# Patient Record
Sex: FEMALE | Race: WHITE | NOT HISPANIC OR LATINO | Employment: OTHER | URBAN - METROPOLITAN AREA
[De-identification: names, ages, dates, MRNs, and addresses within clinical notes are randomized per-mention and may not be internally consistent; named-entity substitution may affect disease eponyms.]

---

## 2022-05-06 PROBLEM — G47.33 OSA (OBSTRUCTIVE SLEEP APNEA): Status: ACTIVE | Noted: 2022-03-31

## 2022-05-06 PROBLEM — E78.2 MIXED HYPERLIPIDEMIA: Status: ACTIVE | Noted: 2021-10-01

## 2022-05-06 PROBLEM — M35.00 SJOGREN'S DISEASE: Status: ACTIVE | Noted: 2021-11-01

## 2022-05-06 PROBLEM — R00.2 PALPITATIONS: Status: ACTIVE | Noted: 2021-10-01

## 2022-05-06 PROBLEM — M06.9 RHEUMATOID ARTHRITIS: Status: ACTIVE | Noted: 2021-01-14

## 2022-05-06 NOTE — PROGRESS NOTES
Chief Complaint  Palpitations and Hyperlipidemia      History of Present Illness  Alejandrina Bhardwaj presents to [facility] CARDIOLOGY  Patient is a 57-year-old female with a recent diagnosis of lupus and rheumatoid arthritis who had noticed increased heart palpitations and heart fluttering spells.  She states she notices more so when she lays down at night and is not associated with any ongoing chest discomfort or shortness of breath issues.  She has been under increased amount of stress recently due to the passing of 3 relatives in the last year, recent moving to the area, and her diagnosis of lupus and rheumatoid arthritis.  She does not report any significant change in her functional exercise capacity she did undergo an echocardiogram which had some abnormalities which she had some anxiety regarding the changes given some family history of valvular issues with other family members    Past Medical History:   Diagnosis Date   • Hyperlipidemia    • Lupus (systemic lupus erythematosus) (HCC)    • Palpitation    • Rheumatoid arthritis (HCC)    • Sjogren's disease (HCC)          Current Outpatient Medications:   •  Ayr 0.65 % nasal spray, , Disp: , Rfl:   •  Cholecalciferol 25 MCG (1000 UT) tablet dispersible, Take  by mouth Daily., Disp: , Rfl:   •  hydrocortisone 2.5 % cream, APPLY A THIN LAYER TO THE SORES ON FACE TWICE DAILY, Disp: , Rfl:   •  hydroxychloroquine (PLAQUENIL) 200 MG tablet, Daily., Disp: , Rfl:   •  loratadine (CLARITIN) 10 MG tablet, Take 10 mg by mouth As Needed., Disp: , Rfl:   •  mometasone (ELOCON) 0.1 % ointment, Daily., Disp: , Rfl:   •  Multiple Vitamins-Minerals (EMERGEN-C IMMUNE PO), Take 1 tablet by mouth As Needed., Disp: , Rfl:   •  mupirocin (BACTROBAN) 2 % ointment, APPLY TO LESION ON LIP TWICE DAILY UNTIL RESOLVED, Disp: , Rfl:   •  omeprazole (priLOSEC) 40 MG capsule, Take 40 mg by mouth Daily., Disp: , Rfl:   •  sertraline (ZOLOFT) 25 MG tablet, Take 25 mg by mouth  "Daily., Disp: , Rfl:     There are no discontinued medications.  Allergies   Allergen Reactions   • Iodinated Diagnostic Agents Hives and Itching   • Erythromycin Nausea And Vomiting and Rash        Social History     Tobacco Use   • Smoking status: Never Smoker   Vaping Use   • Vaping Use: Never used   Substance Use Topics   • Alcohol use: Never   • Drug use: Never       History reviewed. No pertinent family history.     Objective     /58   Pulse 80   Ht 160 cm (63\")   Wt 68.5 kg (151 lb)   BMI 26.75 kg/m²       Physical Exam    General Appearance:   · no acute distress  · Alert and oriented x3  HENT:   · lips not cyanotic  · Atraumatic  Neck:  · No jvd   · supple  Respiratory:  · no respiratory distress  · normal breath sounds  · no rales  Cardiovascular:  · Regular rate and rhythm  · no S3, no S4   · no murmur  · no rub  Extremities  · No cyanosis  · lower extremity edema: none    Skin:   · warm, dry  · No rashes    Result Review :     No results found for: PROBNP     WBC (WHITE BLOOD CELL)-TL   4.8 - 10.8 10*3/uL 3.7 Low     RBC (RED BLOOD CELL-TL   4.20 - 5.40 10*6/uL 4.52    HEMOGLOBIN-TL   12.0 - 16.0 g/dL 13.2    HEMATOCRIT-TL   37 - 47 % 40.8    MCV-TL   81 - 99 fL 90.3    MCH-TL   27 - 31 pg 29.2    MCHC-TL   32 - 36 g/dL 32.4    RDW-SD-TL   37 - 54 fL 46.5    RDW-SD-TL   11.5 - 15.5 % 13.8    Platelet Count-TL   130 - 400 10*3/uL 94 Low     MPV-TL   9.2 - 12.6 fL 12.0    NRBC%-TL   0.0 % 0.0    ABS NRBC-TL   0.0 10*3/uL 0.000    DIFF TYPE-TL  AUTO DIFF    NEUT%-TL   % 67.9    LYMPH%-TL   % 21.1    MONO%-TL   % 8.9    Eosinophil %   % 1.6    BASO%-TL   % 0.5    IG %-TL   % 0.0    ABS NEUT-TL   2.2 - 4.8 10*3/uL 2.500    ABS LYMPH-TL   1.3 - 2.9 10*3/uL 0.780 Low     ABS MONO-TL   0.1 - 1.0 10*3/uL 0.330    ABS EOS-TL   0.00 - 0.40 10*3/uL 0.060    ABS BASO-TL   0.0 - 0.1 10*3/uL <0.030    ABS IG-TL   0.0 - 2.0 10*3/uL <0.030      Order: [id number]  Component   Ref Range & Units 7 mo ago "   GLUCOSE-TL   70 - 110 mg/dL 91    BUN-TL   7 - 18 mg/dL 12    CREATININE-TL   0.6 - 1.0 mg/dL 0.8    SODIUM-TL   136 - 145 mmol/L 143    POTASSIUM-TL   3.5 - 5.1 mmol/L 4.2    Chloride   98 - 107 mmol/L 104    CARBON DIOXIDE-TL   21 - 32 mmol/L 28    CALCIUM-TL   8.7 - 10.2 mg/dL 10.3 High     TOTAL PROTEIN-TL   6.4 - 8.2 g/dL 8.3 High     ALBUMIN-TL   3.4 - 5.0 g/dL 4.5    TOTAL BILIRUBIN-TL   0.2 - 1.3 mg/dL 0.39    GOT (AST)-TL   15 - 37 U/L 28    GPT (ALT)-TL   0 - 35 U/L 22    ALP-TL   50 - 136 U/L 112    GFR ESTIMATE-TL   mL/min 74    IF -TL   mL/min 89      CHOLESTEROL 2-TL   <200 mg/dL 172    Comment:    NATIONAL CHOLESTEROL GUIDELINES   [facility] ([facility]) GUIDELINES FOR CLASSIFICATION, TESTING AND MANAGEMENT OF CHOLESTEROL LEVELS IN ADULTS OVER 20 YEARS OF AGE. THIS NEW CLASSIFICATION CREATES THREE CATEGORIES OF RISK FOR CORONARY HEART DISEASE, REGARDLESS   OF AGE OR SEX, ACCORDING TO TOTAL LDL CHOLESTEROLS LEVELS.     BASED ON TOTAL CHOLESTEROL LEVEL   DESIRABLE      <200 MG/DL   BORDERLINE-HIGH 200-239 MG/DL   HIGH            >=240 MG/DL   TRIGLYCERIDES 2-TL   30 - 200 mg/dL 150    Comment: DUE TO REVISED SPECIFICITY OF A TRIGLYCERIDE RESULT AT 600MG/DL OR GREATER MAY CAUSE A POSITIVE BIAS OF APPROXIMATELY 2.1 MMOL/L TO CHLORIDE WHICH CAN RESULT IN AN ERRONEOUSLY LOW ANION GAP.   HDL-TL   >60 mg/dL 42 Low     Comment:    <40 MG/DL= MAJOR RISK FACTOR FOR CHD   60 MG/DL OR GREATER= NEG RISK FACTOR FOR CHD   LDL Cholesterol    0 - 99 mg/dL 100 High     RISK FACTOR-TL  4.1             Echocardiogram 1/22    CONCLUSION:  Preserved RV and LV systolic function with evidence of grade   1   diastolic dysfunction.   No significant valvular disease.  Trivial tricuspid insufficiency.  RV   pressure   is 17 mmHg.     Holter monitor 1/22  Rare PVCs  CONCLUSION:  Fairly unremarkable 24-hour Holter as described         ECG 12 Lead    Date/Time: 5/19/2022 12:30 PM  Performed  by: Fermin Bhardwaj MD  Authorized by: Fermin Bhardwaj MD   Comparison: not compared with previous ECG   Comments: Consider inferior MI old           No results found for this or any previous visit.             The ASCVD Risk score (Leola MAMTA Jr., et al., 2013) failed to calculate for the following reasons:    Cannot find a previous HDL lab    Cannot find a previous total cholesterol lab     Diagnoses and all orders for this visit:    1. Palpitations (Primary)  Assessment & Plan:  Patient with increased frequency of heart palpitations noticing at night echocardiogram was unremarkable and Holter showed just rare PVCs.  Feel the symptoms are caused by PVCs and likely exacerbated by increased stress in her life reassured patient of the benign nature.  Encourage a trial of magnesium supplementation 500 mg once a day if symptoms are not tolerable after that then low-dose beta-blocker or calcium channel blockers would be an alternative as well      2. PVC's (premature ventricular contractions)  Assessment & Plan:  And frequents with structurally normal heart and EKG feel benign in nature does recommend a trial of magnesium at this point      Other orders  -     ECG 12 Lead          Follow Up     No follow-ups on file.          Patient was given instructions and counseling regarding her condition or for health maintenance advice. Please see specific information pulled into the AVS if appropriate.

## 2022-05-19 ENCOUNTER — OFFICE VISIT (OUTPATIENT)
Dept: CARDIOLOGY | Facility: CLINIC | Age: 57
End: 2022-05-19

## 2022-05-19 VITALS
SYSTOLIC BLOOD PRESSURE: 111 MMHG | WEIGHT: 151 LBS | HEIGHT: 63 IN | HEART RATE: 80 BPM | DIASTOLIC BLOOD PRESSURE: 58 MMHG | BODY MASS INDEX: 26.75 KG/M2

## 2022-05-19 DIAGNOSIS — I49.3 PVC'S (PREMATURE VENTRICULAR CONTRACTIONS): ICD-10-CM

## 2022-05-19 DIAGNOSIS — R00.2 PALPITATIONS: Primary | ICD-10-CM

## 2022-05-19 PROCEDURE — 93000 ELECTROCARDIOGRAM COMPLETE: CPT | Performed by: INTERNAL MEDICINE

## 2022-05-19 PROCEDURE — 99203 OFFICE O/P NEW LOW 30 MIN: CPT | Performed by: INTERNAL MEDICINE

## 2022-05-19 RX ORDER — HYDROXYCHLOROQUINE SULFATE 200 MG/1
200 TABLET, FILM COATED ORAL
COMMUNITY
Start: 2022-05-16

## 2022-05-19 RX ORDER — OMEPRAZOLE 40 MG/1
40 CAPSULE, DELAYED RELEASE ORAL DAILY
COMMUNITY
Start: 2022-05-09

## 2022-05-19 RX ORDER — LORATADINE 10 MG/1
10 TABLET ORAL AS NEEDED
COMMUNITY
Start: 2022-05-09

## 2022-05-19 RX ORDER — ALLOPURINOL 300 MG/1
2 TABLET ORAL AS NEEDED
COMMUNITY
Start: 2022-05-09

## 2022-05-19 RX ORDER — MOMETASONE FUROATE 1 MG/G
OINTMENT TOPICAL DAILY
COMMUNITY
Start: 2022-04-15

## 2022-05-19 RX ORDER — SERTRALINE HYDROCHLORIDE 25 MG/1
25 TABLET, FILM COATED ORAL DAILY
COMMUNITY
Start: 2022-05-02 | End: 2022-10-07 | Stop reason: SDUPTHER

## 2022-05-19 NOTE — ASSESSMENT & PLAN NOTE
Patient with increased frequency of heart palpitations noticing at night echocardiogram was unremarkable and Holter showed just rare PVCs.  Feel the symptoms are caused by PVCs and likely exacerbated by increased stress in her life reassured patient of the benign nature.  Encourage a trial of magnesium supplementation 500 mg once a day if symptoms are not tolerable after that then low-dose beta-blocker or calcium channel blockers would be an alternative as well

## 2022-05-19 NOTE — ASSESSMENT & PLAN NOTE
And frequents with structurally normal heart and EKG feel benign in nature does recommend a trial of magnesium at this point

## 2022-10-07 ENCOUNTER — OFFICE VISIT (OUTPATIENT)
Dept: FAMILY MEDICINE CLINIC | Facility: CLINIC | Age: 57
End: 2022-10-07

## 2022-10-07 ENCOUNTER — HOSPITAL ENCOUNTER (OUTPATIENT)
Dept: GENERAL RADIOLOGY | Facility: HOSPITAL | Age: 57
Discharge: HOME | End: 2022-10-07
Admitting: FAMILY MEDICINE

## 2022-10-07 VITALS
HEIGHT: 63 IN | BODY MASS INDEX: 28.53 KG/M2 | DIASTOLIC BLOOD PRESSURE: 104 MMHG | HEART RATE: 99 BPM | TEMPERATURE: 97.6 F | SYSTOLIC BLOOD PRESSURE: 128 MMHG | OXYGEN SATURATION: 100 % | WEIGHT: 161 LBS

## 2022-10-07 DIAGNOSIS — E78.2 MIXED HYPERLIPIDEMIA: ICD-10-CM

## 2022-10-07 DIAGNOSIS — R53.82 CHRONIC FATIGUE: ICD-10-CM

## 2022-10-07 DIAGNOSIS — M35.01 SJOGREN'S SYNDROME WITH KERATOCONJUNCTIVITIS SICCA: ICD-10-CM

## 2022-10-07 DIAGNOSIS — M25.512 CHRONIC LEFT SHOULDER PAIN: ICD-10-CM

## 2022-10-07 DIAGNOSIS — G89.29 CHRONIC LEFT SHOULDER PAIN: ICD-10-CM

## 2022-10-07 DIAGNOSIS — Z00.00 ENCOUNTER FOR MEDICAL EXAMINATION TO ESTABLISH CARE: ICD-10-CM

## 2022-10-07 DIAGNOSIS — R35.0 URINE FREQUENCY: Primary | ICD-10-CM

## 2022-10-07 DIAGNOSIS — Z12.31 ENCOUNTER FOR SCREENING MAMMOGRAM FOR MALIGNANT NEOPLASM OF BREAST: ICD-10-CM

## 2022-10-07 DIAGNOSIS — M05.9 RHEUMATOID ARTHRITIS WITH POSITIVE RHEUMATOID FACTOR, INVOLVING UNSPECIFIED SITE: ICD-10-CM

## 2022-10-07 DIAGNOSIS — M34.1 CREST (CALCINOSIS, RAYNAUD'S PHENOMENON, ESOPHAGEAL DYSFUNCTION, SCLERODACTYLY, TELANGIECTASIA): ICD-10-CM

## 2022-10-07 LAB
ALBUMIN SERPL-MCNC: 4.4 G/DL (ref 3.5–5.2)
ALBUMIN/GLOB SERPL: 1 G/DL
ALP SERPL-CCNC: 137 U/L (ref 39–117)
ALT SERPL W P-5'-P-CCNC: 37 U/L (ref 1–33)
ANION GAP SERPL CALCULATED.3IONS-SCNC: 7.8 MMOL/L (ref 5–15)
AST SERPL-CCNC: 45 U/L (ref 1–32)
BASOPHILS # BLD AUTO: 0.03 10*3/MM3 (ref 0–0.2)
BASOPHILS NFR BLD AUTO: 0.8 % (ref 0–1.5)
BILIRUB BLD-MCNC: NEGATIVE MG/DL
BILIRUB SERPL-MCNC: 0.4 MG/DL (ref 0–1.2)
BUN SERPL-MCNC: 10 MG/DL (ref 6–20)
BUN/CREAT SERPL: 10.8 (ref 7–25)
CALCIUM SPEC-SCNC: 9.9 MG/DL (ref 8.6–10.5)
CHLORIDE SERPL-SCNC: 102 MMOL/L (ref 98–107)
CHOLEST SERPL-MCNC: 179 MG/DL (ref 0–200)
CLARITY, POC: CLEAR
CO2 SERPL-SCNC: 29.2 MMOL/L (ref 22–29)
COLOR UR: YELLOW
CREAT SERPL-MCNC: 0.93 MG/DL (ref 0.57–1)
DEPRECATED RDW RBC AUTO: 40.2 FL (ref 37–54)
EGFRCR SERPLBLD CKD-EPI 2021: 70.9 ML/MIN/1.73
EOSINOPHIL # BLD AUTO: 0.06 10*3/MM3 (ref 0–0.4)
EOSINOPHIL NFR BLD AUTO: 1.7 % (ref 0.3–6.2)
ERYTHROCYTE [DISTWIDTH] IN BLOOD BY AUTOMATED COUNT: 13 % (ref 12.3–15.4)
EXPIRATION DATE: NORMAL
FOLATE SERPL-MCNC: 8.29 NG/ML (ref 4.78–24.2)
GLOBULIN UR ELPH-MCNC: 4.6 GM/DL
GLUCOSE SERPL-MCNC: 92 MG/DL (ref 65–99)
GLUCOSE UR STRIP-MCNC: NEGATIVE MG/DL
HCT VFR BLD AUTO: 40 % (ref 34–46.6)
HDLC SERPL-MCNC: 44 MG/DL (ref 40–60)
HGB BLD-MCNC: 13.6 G/DL (ref 12–15.9)
KETONES UR QL: NEGATIVE
LDLC SERPL CALC-MCNC: 111 MG/DL (ref 0–100)
LDLC/HDLC SERPL: 2.45 {RATIO}
LEUKOCYTE EST, POC: NEGATIVE
LYMPHOCYTES # BLD AUTO: 0.83 10*3/MM3 (ref 0.7–3.1)
LYMPHOCYTES NFR BLD AUTO: 23 % (ref 19.6–45.3)
Lab: NORMAL
MCH RBC QN AUTO: 29.1 PG (ref 26.6–33)
MCHC RBC AUTO-ENTMCNC: 34 G/DL (ref 31.5–35.7)
MCV RBC AUTO: 85.5 FL (ref 79–97)
MONOCYTES # BLD AUTO: 0.36 10*3/MM3 (ref 0.1–0.9)
MONOCYTES NFR BLD AUTO: 10 % (ref 5–12)
NEUTROPHILS NFR BLD AUTO: 2.32 10*3/MM3 (ref 1.7–7)
NEUTROPHILS NFR BLD AUTO: 64.2 % (ref 42.7–76)
NITRITE UR-MCNC: NEGATIVE MG/ML
PH UR: 7.5 [PH] (ref 5–8)
PLATELET # BLD AUTO: 97 10*3/MM3 (ref 140–450)
PMV BLD AUTO: 13.6 FL (ref 6–12)
POTASSIUM SERPL-SCNC: 4.4 MMOL/L (ref 3.5–5.2)
PROT SERPL-MCNC: 9 G/DL (ref 6–8.5)
PROT UR STRIP-MCNC: NEGATIVE MG/DL
RBC # BLD AUTO: 4.68 10*6/MM3 (ref 3.77–5.28)
RBC # UR STRIP: NEGATIVE /UL
SODIUM SERPL-SCNC: 139 MMOL/L (ref 136–145)
SP GR UR: 1.01 (ref 1–1.03)
T4 FREE SERPL-MCNC: 1.12 NG/DL (ref 0.93–1.7)
TRIGL SERPL-MCNC: 136 MG/DL (ref 0–150)
TSH SERPL DL<=0.05 MIU/L-ACNC: 2.12 UIU/ML (ref 0.27–4.2)
UROBILINOGEN UR QL: NORMAL
VIT B12 BLD-MCNC: 620 PG/ML (ref 211–946)
VLDLC SERPL-MCNC: 24 MG/DL (ref 5–40)
WBC NRBC COR # BLD: 3.61 10*3/MM3 (ref 3.4–10.8)

## 2022-10-07 PROCEDURE — 82746 ASSAY OF FOLIC ACID SERUM: CPT | Performed by: FAMILY MEDICINE

## 2022-10-07 PROCEDURE — 80050 GENERAL HEALTH PANEL: CPT | Performed by: FAMILY MEDICINE

## 2022-10-07 PROCEDURE — 80061 LIPID PANEL: CPT | Performed by: FAMILY MEDICINE

## 2022-10-07 PROCEDURE — 99204 OFFICE O/P NEW MOD 45 MIN: CPT | Performed by: FAMILY MEDICINE

## 2022-10-07 PROCEDURE — 84439 ASSAY OF FREE THYROXINE: CPT | Performed by: FAMILY MEDICINE

## 2022-10-07 PROCEDURE — 82607 VITAMIN B-12: CPT | Performed by: FAMILY MEDICINE

## 2022-10-07 PROCEDURE — 73030 X-RAY EXAM OF SHOULDER: CPT

## 2022-10-07 RX ORDER — PILOCARPINE HYDROCHLORIDE 5 MG/1
5 TABLET, FILM COATED ORAL
COMMUNITY
Start: 2022-09-22

## 2022-10-07 RX ORDER — TRAZODONE HYDROCHLORIDE 50 MG/1
50 TABLET ORAL NIGHTLY
COMMUNITY
Start: 2022-08-20

## 2022-10-07 RX ORDER — LIFITEGRAST 50 MG/ML
SOLUTION/ DROPS OPHTHALMIC
COMMUNITY
Start: 2022-09-20 | End: 2022-12-30

## 2022-10-07 RX ORDER — HYDROXYCHLOROQUINE SULFATE 200 MG/1
200 TABLET, FILM COATED ORAL DAILY
COMMUNITY
Start: 2022-09-22 | End: 2022-10-07 | Stop reason: SDUPTHER

## 2022-10-07 RX ORDER — OMEPRAZOLE 40 MG/1
1 CAPSULE, DELAYED RELEASE ORAL DAILY
COMMUNITY
Start: 2022-08-22 | End: 2022-10-07 | Stop reason: SDUPTHER

## 2022-10-07 RX ORDER — SERTRALINE HYDROCHLORIDE 25 MG/1
1 TABLET, FILM COATED ORAL DAILY
COMMUNITY
Start: 2022-08-22 | End: 2022-10-07 | Stop reason: SDUPTHER

## 2022-10-07 RX ORDER — BUSPIRONE HYDROCHLORIDE 7.5 MG/1
7.5 TABLET ORAL 2 TIMES DAILY
COMMUNITY
Start: 2022-08-20 | End: 2022-12-30

## 2022-10-07 NOTE — PROGRESS NOTES
Chief Complaint   Patient presents with   • Establish Care     Back pain         Subjective     Alejandrina Bhardwaj  has a past medical history of Lupus (systemic lupus erythematosus) (HCC), Rheumatoid arthritis (HCC), and Sjogren's disease (HCC).    Establish care- she presents today to establish care with a new PCP.    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  Currently she is treated with Plaquenil.  She does going get her routine eye exams every 6 months.    Hyperlipidemia- currently she is not on any cholesterol medication.    Anxiety disorder- she takes her sertraline 25 mg daily.  She states for the most part she feels depressed very frequently and has frequent tearful episodes.      PHQ-2 Depression Screening  Little interest or pleasure in doing things?     Feeling down, depressed, or hopeless?     PHQ-2 Total Score     PHQ-9 Depression Screening  Little interest or pleasure in doing things?     Feeling down, depressed, or hopeless?     Trouble falling or staying asleep, or sleeping too much?     Feeling tired or having little energy?     Poor appetite or overeating?     Feeling bad about yourself - or that you are a failure or have let yourself or your family down?     Trouble concentrating on things, such as reading the newspaper or watching television?     Moving or speaking so slowly that other people could have noticed? Or the opposite - being so fidgety or restless that you have been moving around a lot more than usual?     Thoughts that you would be better off dead, or of hurting yourself in some way?     PHQ-9 Total Score     If you checked off any problems, how difficult have these problems made it for you to do your work, take care of things at home, or get along with other people?       Allergies   Allergen Reactions   • Erythromycin Nausea And Vomiting and Rash   • Contrast Dye Hives   • Iodinated Diagnostic Agents Hives and Itching   • Iodine Hives       Prior to Admission  medications    Medication Sig Start Date End Date Taking? Authorizing Provider   Ayr 0.65 % nasal spray 2 sprays As Needed. 5/9/22  Yes Oralia Chris MD   busPIRone (BUSPAR) 7.5 MG tablet Take 7.5 mg by mouth 2 (Two) Times a Day. 8/20/22  Yes Oralia Chris MD   Cholecalciferol 25 MCG (1000 UT) tablet dispersible Take  by mouth Daily.   Yes Oralia Chris MD   hydrocortisone 2.5 % cream APPLY A THIN LAYER TO THE SORES ON FACE TWICE DAILY 4/15/22  Yes Oralia Chris MD   hydroxychloroquine (PLAQUENIL) 200 MG tablet Take 200 mg by mouth. 5/16/22  Yes Oralia Chris MD   loratadine (CLARITIN) 10 MG tablet Take 10 mg by mouth As Needed. 5/9/22  Yes Oralia Chris MD   mometasone (ELOCON) 0.1 % ointment Daily. 4/15/22  Yes Oralia Chris MD   mupirocin (BACTROBAN) 2 % ointment APPLY TO LESION ON LIP TWICE DAILY UNTIL RESOLVED 4/15/22  Yes Oralia Chris MD   omeprazole (priLOSEC) 40 MG capsule Take 40 mg by mouth Daily. 5/9/22  Yes Oralia Chris MD   pilocarpine (SALAGEN) 5 MG tablet Take 5 mg by mouth. DAILY 9/22/22  Yes Oralia Chris MD   sertraline (ZOLOFT) 25 MG tablet Take 25 mg by mouth Daily. 5/2/22  Yes Oralia Chris MD   traZODone (DESYREL) 50 MG tablet Take 50 mg by mouth Every Night. 8/20/22  Yes Oralia Chris MD   Xiidra 5 % ophthalmic solution  9/20/22  Yes Oralia Chris MD   hydroxychloroquine (PLAQUENIL) 200 MG tablet Take 200 mg by mouth Daily. 9/22/22 10/7/22  Oralia Chris MD   Multiple Vitamins-Minerals (EMERGEN-C IMMUNE PO) Take 1 tablet by mouth As Needed.  10/7/22  Oralia Chris MD   omeprazole (priLOSEC) 40 MG capsule Take 1 capsule by mouth Daily. 8/22/22 10/7/22  Oralia Chris MD   sertraline (ZOLOFT) 25 MG tablet Take 1 tablet by mouth Daily. 8/22/22 10/7/22  Provider, Historical, MD        Patient Active Problem List   Diagnosis   • Mixed hyperlipidemia   • VICTOIRANO (obstructive  sleep apnea)   • Palpitations   • Lupus (systemic lupus erythematosus) (HCC)   • Rheumatoid arthritis (HCC)   • Sjogren's disease (HCC)   • PVC's (premature ventricular contractions)   • CREST (calcinosis, Raynaud's phenomenon, esophageal dysfunction, sclerodactyly, telangiectasia) (HCC)   • Chronic fatigue   • Encounter for medical examination to establish care   • Encounter for screening mammogram for malignant neoplasm of breast   • Chronic left shoulder pain        History reviewed. No pertinent surgical history.    Social History     Socioeconomic History   • Marital status: Single   Tobacco Use   • Smoking status: Never Smoker   • Smokeless tobacco: Never Used   Vaping Use   • Vaping Use: Never used   Substance and Sexual Activity   • Alcohol use: Never   • Drug use: Never   • Sexual activity: Defer       Family History   Problem Relation Age of Onset   • Lupus Mother    • Lupus Sister    • Cancer Paternal Grandmother        Family history, surgical history, past medical history, Allergies and meds reviewed with patient today and updated in [employer] EMR.     ROS:  Review of Systems   Constitutional: Positive for fatigue.   HENT: Positive for rhinorrhea. Negative for congestion and postnasal drip.    Eyes: Negative for blurred vision and visual disturbance.        (+) Dry eyes   Respiratory: Negative for cough, chest tightness, shortness of breath and wheezing.    Cardiovascular: Negative for chest pain and palpitations.   Genitourinary: Positive for frequency. Negative for difficulty urinating, dysuria and hematuria.   Musculoskeletal: Positive for back pain (thoracic).   Skin: Negative for rash and skin lesions.   Allergic/Immunologic: Negative for environmental allergies.   Neurological: Negative for light-headedness and headache.   Psychiatric/Behavioral: Positive for depressed mood. Negative for sleep disturbance. The patient is nervous/anxious.        OBJECTIVE:  Vitals:    10/07/22 0952   BP: (!) 128/104  "  BP Location: Right arm   Patient Position: Sitting   Pulse: 99   Temp: 97.6 °F (36.4 °C)   SpO2: 100%   Weight: 73 kg (161 lb)   Height: 160 cm (63\")     No exam data present   Body mass index is 28.52 kg/m².  No LMP recorded.    Physical Exam  Vitals and nursing note reviewed.   Constitutional:       General: She is not in acute distress.     Appearance: Normal appearance. She is normal weight.   HENT:      Head: Normocephalic.      Right Ear: Tympanic membrane, ear canal and external ear normal.      Left Ear: Tympanic membrane, ear canal and external ear normal.      Nose: Nose normal.      Mouth/Throat:      Mouth: Mucous membranes are moist.      Dentition: Has dentures.      Pharynx: Oropharynx is clear.   Eyes:      General: No scleral icterus.     Conjunctiva/sclera: Conjunctivae normal.      Pupils: Pupils are equal, round, and reactive to light.   Cardiovascular:      Rate and Rhythm: Normal rate and regular rhythm.      Pulses: Normal pulses.      Heart sounds: Normal heart sounds. No murmur heard.  Pulmonary:      Effort: Pulmonary effort is normal.      Breath sounds: Normal breath sounds. No wheezing, rhonchi or rales.   Musculoskeletal:      Cervical back: Neck supple. No rigidity or tenderness.   Lymphadenopathy:      Cervical: No cervical adenopathy.   Skin:     General: Skin is warm and dry.      Coloration: Skin is not jaundiced.      Findings: No rash.      Comments: Telangiectasia face and hands   Neurological:      General: No focal deficit present.      Mental Status: She is alert and oriented to person, place, and time.   Psychiatric:         Mood and Affect: Mood normal.         Thought Content: Thought content normal.         Judgment: Judgment normal.         Procedures    Office Visit on 10/07/2022   Component Date Value Ref Range Status   • Color 10/07/2022 Yellow  Yellow, Straw, Dark Yellow, Brittani Final   • Clarity, UA 10/07/2022 Clear  Clear Final   • Specific Gravity  10/07/2022 " 1.015  1.005 - 1.030 Final   • pH, Urine 10/07/2022 7.5  5.0 - 8.0 Final   • Leukocytes 10/07/2022 Negative  Negative Final   • Nitrite, UA 10/07/2022 Negative  Negative Final   • Protein, POC 10/07/2022 Negative  Negative mg/dL Final   • Glucose, UA 10/07/2022 Negative  Negative mg/dL Final   • Ketones, UA 10/07/2022 Negative  Negative Final   • Urobilinogen, UA 10/07/2022 0.2 E.U./dL  Normal, 0.2 E.U./dL Final   • Bilirubin 10/07/2022 Negative  Negative Final   • Blood, UA 10/07/2022 Negative  Negative Final   • Lot Number 10/07/2022 [id number]   Final   • Expiration Date 10/07/2022 02/30/2023   Final       ASSESSMENT/ PLAN:    Diagnoses and all orders for this visit:    1. Urine frequency (Primary)  -     POCT urinalysis dipstick, automated    2. CREST (calcinosis, Raynaud's phenomenon, esophageal dysfunction, sclerodactyly, telangiectasia) (HCC)  Assessment & Plan:  She sees rheumatology on a regular basis and currently on Plaquenil.  We discussed all the common findings with this syndrome and that she needs to wear gloves socks on a regular basis make sure that she is staying warm on a regular basis also to prevent any type of vaso-occlusive crises.  I also instructed her on some type of routine low impact activity such as walking or yoga.      3. Sjogren's syndrome with keratoconjunctivitis sicca (HCC)    4. Rheumatoid arthritis with positive rheumatoid factor, involving unspecified site (HCC)    5. Mixed hyperlipidemia  Assessment & Plan:  It appears to been a while since she had a routine lipid profile.  We will update that with her labs here today.    Orders:  -     Comprehensive Metabolic Panel  -     Lipid Panel  -     TSH+Free T4    6. Chronic fatigue  Assessment & Plan:  Her chronic fatigue is most likely related to her chronic rheumatologic condition.  We will get some routine labs regardless of this.    Orders:  -     CBC Auto Differential  -     Comprehensive Metabolic Panel  -     TSH+Free T4  -      Vitamin B12 & Folate    7. Encounter for medical examination to establish care  Assessment & Plan:  She presents today to establish care.  We will update some routine baseline studies.  It has been quite sometime since she had a routine gynecological exam.  Encouraged that she needs to have that done on a periodic also regular basis.      8. Encounter for screening mammogram for malignant neoplasm of breast  -     Mammo Screening Digital Tomosynthesis Bilateral With CAD; Future    9. Chronic left shoulder pain  -     XR Shoulder 2+ View Left; Future    Other orders  -     sertraline (ZOLOFT) 50 MG tablet; Take 1 tablet by mouth Daily.  Dispense: 90 tablet; Refill: 1      Orders Placed Today:     New Medications Ordered This Visit   Medications   • sertraline (ZOLOFT) 50 MG tablet     Sig: Take 1 tablet by mouth Daily.     Dispense:  90 tablet     Refill:  1        Management Plan:     An After Visit Summary was printed and given to the patient at discharge.    Follow-up: Return in about 2 months (around 12/7/2022).    Rickey Mcginnis DO 10/7/2022 10:44 EDT  This note was electronically signed.

## 2022-10-07 NOTE — ASSESSMENT & PLAN NOTE
It appears to been a while since she had a routine lipid profile.  We will update that with her labs here today.

## 2022-10-07 NOTE — ASSESSMENT & PLAN NOTE
Her chronic fatigue is most likely related to her chronic rheumatologic condition.  We will get some routine labs regardless of this.

## 2022-10-07 NOTE — ASSESSMENT & PLAN NOTE
She presents today to establish care.  We will update some routine baseline studies.  It has been quite sometime since she had a routine gynecological exam.  Encouraged that she needs to have that done on a periodic also regular basis.

## 2022-10-07 NOTE — ASSESSMENT & PLAN NOTE
She sees rheumatology on a regular basis and currently on Plaquenil.  We discussed all the common findings with this syndrome and that she needs to wear gloves socks on a regular basis make sure that she is staying warm on a regular basis also to prevent any type of vaso-occlusive crises.  I also instructed her on some type of routine low impact activity such as walking or yoga.

## 2022-10-10 DIAGNOSIS — R79.89 ELEVATED LFTS: Primary | ICD-10-CM

## 2022-10-11 ENCOUNTER — PATIENT ROUNDING (BHMG ONLY) (OUTPATIENT)
Dept: FAMILY MEDICINE CLINIC | Facility: CLINIC | Age: 57
End: 2022-10-11

## 2022-10-11 NOTE — PROGRESS NOTES
A Symplified MESSAGE HAS BEEN SENT TO THE PATIENT FOR PATIENT ROUNDING WITH [facility]

## 2022-10-12 ENCOUNTER — HOSPITAL ENCOUNTER (OUTPATIENT)
Dept: ULTRASOUND IMAGING | Facility: HOSPITAL | Age: 57
Discharge: HOME | End: 2022-10-12
Admitting: FAMILY MEDICINE

## 2022-10-12 DIAGNOSIS — R79.89 ELEVATED LFTS: ICD-10-CM

## 2022-10-12 PROCEDURE — 76705 ECHO EXAM OF ABDOMEN: CPT

## 2022-10-13 DIAGNOSIS — R16.1 SPLENOMEGALY: Primary | ICD-10-CM

## 2022-10-27 ENCOUNTER — TELEPHONE (OUTPATIENT)
Dept: FAMILY MEDICINE CLINIC | Facility: CLINIC | Age: 57
End: 2022-10-27

## 2022-10-27 DIAGNOSIS — R16.1 SPLENOMEGALY: Primary | ICD-10-CM

## 2022-10-29 ENCOUNTER — TELEPHONE (OUTPATIENT)
Dept: FAMILY MEDICINE CLINIC | Facility: CLINIC | Age: 57
End: 2022-10-29

## 2022-10-29 NOTE — TELEPHONE ENCOUNTER
Was seen at an urgent Care and tested positive for Covid 19.  She was not prescribed any medicaiton at that time.  She notes to have congestion and cough. She would like Paxlovid.  Reviewed with patient facts of EUA  Medication and side effect potential.  Reviewed her kidney function to be adequate.  Noted with patient there may be medication she would have to hold while taking paxlovid for the five day treatment.  She would like to read on before prescribing.  I let her know that she could call back and I would send in but encouraged her to look at a fact sheet on Paxlovid.

## 2022-10-31 ENCOUNTER — TELEPHONE (OUTPATIENT)
Dept: FAMILY MEDICINE CLINIC | Facility: CLINIC | Age: 57
End: 2022-10-31

## 2022-10-31 NOTE — TELEPHONE ENCOUNTER
Caller: Zheng Bhardwajraisa BUSBY    Relationship: Self    Best call back number: [phone]    What is the best time to reach you: ANYTIME    Who are you requesting to speak with (clinical staff, provider,  specific staff member): CLINICAL    What was the call regarding: PATIENT IS REQUESTING CALL REGARDING POSITIVE COVID CASE. SHE TESTED POSITIVE ON 10/29/2022 THROUGH URGENT CARE. HER CURRENT SYMPTOMS ARE NAUSEA, COUGH, DIARRHEA, AND BODY ACHES. SHE WAS PRESCRIBED BROM/PSE/DM COUGH SYRUP BUT IT JUST PUTS HER TO SLEEP AND WAS ADVISED TO TAKE OVER THE COUNTER MUCINEX.. SHE IS SEEKING ADVICE ON WHAT SHE SHOULD DO OR MEDICATIONS SHE SHOULD TAKE.      IF ANYTHING NEEDS TO BE PRESCRIBED, [facility] [address] [place] [address] [phone]  - [phone] [facility]  [phone] IS THE PHARMACY.     Do you require a callback: YES

## 2022-11-07 ENCOUNTER — APPOINTMENT (OUTPATIENT)
Dept: CT IMAGING | Facility: HOSPITAL | Age: 57
End: 2022-11-07

## 2022-11-07 ENCOUNTER — OFFICE VISIT (OUTPATIENT)
Dept: FAMILY MEDICINE CLINIC | Facility: CLINIC | Age: 57
End: 2022-11-07

## 2022-11-07 VITALS
WEIGHT: 161 LBS | SYSTOLIC BLOOD PRESSURE: 104 MMHG | OXYGEN SATURATION: 99 % | BODY MASS INDEX: 28.53 KG/M2 | DIASTOLIC BLOOD PRESSURE: 66 MMHG | TEMPERATURE: 97.8 F | HEART RATE: 83 BPM | HEIGHT: 63 IN

## 2022-11-07 DIAGNOSIS — J10.1 INFLUENZA B: ICD-10-CM

## 2022-11-07 DIAGNOSIS — U07.1 COVID: ICD-10-CM

## 2022-11-07 DIAGNOSIS — J01.00 ACUTE NON-RECURRENT MAXILLARY SINUSITIS: ICD-10-CM

## 2022-11-07 DIAGNOSIS — J30.2 SEASONAL ALLERGIES: ICD-10-CM

## 2022-11-07 DIAGNOSIS — M32.9 SYSTEMIC LUPUS ERYTHEMATOSUS, UNSPECIFIED SLE TYPE, UNSPECIFIED ORGAN INVOLVEMENT STATUS: ICD-10-CM

## 2022-11-07 DIAGNOSIS — R05.9 COUGH, UNSPECIFIED TYPE: Primary | ICD-10-CM

## 2022-11-07 DIAGNOSIS — R09.81 NASAL CONGESTION: ICD-10-CM

## 2022-11-07 PROBLEM — D72.810 LYMPHOCYTOPENIA: Status: ACTIVE | Noted: 2021-10-01

## 2022-11-07 PROBLEM — M25.521 RIGHT ELBOW PAIN: Status: ACTIVE | Noted: 2022-03-31

## 2022-11-07 PROBLEM — R07.9 CHEST PAIN: Status: ACTIVE | Noted: 2022-02-08

## 2022-11-07 PROBLEM — R23.3 PETECHIAL RASH: Status: ACTIVE | Noted: 2021-10-01

## 2022-11-07 PROBLEM — R30.0 DIFFICULT OR PAINFUL URINATION: Status: ACTIVE | Noted: 2022-03-01

## 2022-11-07 PROBLEM — M54.9 COSTOVERTEBRAL ANGLE TENDERNESS: Status: ACTIVE | Noted: 2022-03-01

## 2022-11-07 PROBLEM — F41.9 ANXIETY: Status: ACTIVE | Noted: 2021-10-01

## 2022-11-07 PROBLEM — S46.212A STRAIN OF LEFT BICEPS: Status: ACTIVE | Noted: 2022-03-31

## 2022-11-07 PROBLEM — R91.1 PULMONARY NODULE LESS THAN 1 CM IN DIAMETER WITH LOW RISK FOR MALIGNANT NEOPLASM: Status: ACTIVE | Noted: 2021-10-01

## 2022-11-07 PROBLEM — R10.9 LEFT FLANK PAIN: Status: ACTIVE | Noted: 2021-10-01

## 2022-11-07 PROBLEM — M54.2 NECK PAIN ON RIGHT SIDE: Status: ACTIVE | Noted: 2022-03-01

## 2022-11-07 PROBLEM — I51.89 GRADE I DIASTOLIC DYSFUNCTION: Status: ACTIVE | Noted: 2022-01-10

## 2022-11-07 PROBLEM — K21.9 GASTROESOPHAGEAL REFLUX DISEASE: Status: ACTIVE | Noted: 2022-02-08

## 2022-11-07 PROBLEM — L30.4 INTERTRIGINOUS DERMATITIS ASSOCIATED WITH MOISTURE: Status: ACTIVE | Noted: 2022-03-01

## 2022-11-07 PROBLEM — M41.9 SCOLIOSIS OF THORACIC SPINE: Status: ACTIVE | Noted: 2021-10-01

## 2022-11-07 PROBLEM — Z91.89 PULMONARY NODULE LESS THAN 1 CM IN DIAMETER WITH LOW RISK FOR MALIGNANT NEOPLASM: Status: ACTIVE | Noted: 2021-10-01

## 2022-11-07 LAB
EXPIRATION DATE: ABNORMAL
FLUAV AG UPPER RESP QL IA.RAPID: NOT DETECTED
FLUBV AG UPPER RESP QL IA.RAPID: DETECTED
INTERNAL CONTROL: ABNORMAL
Lab: ABNORMAL
SARS-COV-2 AG UPPER RESP QL IA.RAPID: DETECTED

## 2022-11-07 PROCEDURE — 87428 SARSCOV & INF VIR A&B AG IA: CPT | Performed by: NURSE PRACTITIONER

## 2022-11-07 PROCEDURE — 99213 OFFICE O/P EST LOW 20 MIN: CPT | Performed by: NURSE PRACTITIONER

## 2022-11-07 RX ORDER — BROMPHENIRAMINE MALEATE, PSEUDOEPHEDRINE HYDROCHLORIDE, AND DEXTROMETHORPHAN HYDROBROMIDE 2; 30; 10 MG/5ML; MG/5ML; MG/5ML
SYRUP ORAL
COMMUNITY
Start: 2022-10-29 | End: 2022-12-30

## 2022-11-07 RX ORDER — GUAIFENESIN 600 MG/1
600 TABLET, EXTENDED RELEASE ORAL 2 TIMES DAILY
COMMUNITY
Start: 2022-10-30 | End: 2022-12-30

## 2022-11-07 RX ORDER — DEXTROMETHORPHAN HYDROBROMIDE AND PROMETHAZINE HYDROCHLORIDE 15; 6.25 MG/5ML; MG/5ML
5 SYRUP ORAL 4 TIMES DAILY PRN
Qty: 473 ML | Refills: 0 | Status: SHIPPED | OUTPATIENT
Start: 2022-11-07 | End: 2022-11-21

## 2022-11-07 RX ORDER — CEFDINIR 300 MG/1
300 CAPSULE ORAL 2 TIMES DAILY
Qty: 20 CAPSULE | Refills: 0 | Status: SHIPPED | OUTPATIENT
Start: 2022-11-07 | End: 2022-11-17

## 2022-11-07 RX ORDER — OSELTAMIVIR PHOSPHATE 75 MG/1
75 CAPSULE ORAL 2 TIMES DAILY
Qty: 10 CAPSULE | Refills: 0 | Status: SHIPPED | OUTPATIENT
Start: 2022-11-07 | End: 2022-11-21

## 2022-11-07 NOTE — PROGRESS NOTES
ACUTE VISIT     Patient Name: Alejandrina Bhardwaj  : 1965   MRN: [id number]     Chief Complaint:    Chief Complaint   Patient presents with   • Cough   • Nasal Congestion   • Generalized Body Aches   • Chest Pressure   • Chills       History of Present Illness: Alejandrina Bhardwaj is a 57 y.o. female who is here today for cough, congestion    She has taken Robitussin, Claritin and Magdalena Selzter  Started on nasonex 3 days prior   She says the symptoms started around Halloween. Worse over the weekend with  body aches, chest pressure, chills.  Patient's sister tested positive for covid 10-14 days prior, lives with her sister   Cough keeping her up at night for the past few nights, cough with deep breath   Also c/o face pain and foul sinus drainage     Subjective      Review of Systems:   Review of Systems   Constitutional: Negative for fever.   HENT: Positive for congestion, postnasal drip and rhinorrhea. Negative for ear pain and sore throat.    Eyes: Negative for discharge.   Respiratory: Positive for cough.    Cardiovascular: Negative for chest pain.   Gastrointestinal: Negative for diarrhea, nausea and vomiting.   Musculoskeletal: Positive for arthralgias.   Allergic/Immunologic: Positive for environmental allergies.   Neurological: Positive for headaches.        Past Medical History:   Past Medical History:   Diagnosis Date   • Lupus (systemic lupus erythematosus) (HCC)    • Rheumatoid arthritis (HCC)    • Sjogren's disease (HCC)        Past Surgical History: No past surgical history on file.    Family History:   Family History   Problem Relation Age of Onset   • Lupus Mother    • Lupus Sister    • Cancer Paternal Grandmother        Social History:   Social History     Socioeconomic History   • Marital status: Single   Tobacco Use   • Smoking status: Never   • Smokeless tobacco: Never   Vaping Use   • Vaping Use: Never used   Substance and Sexual Activity   • Alcohol use: Never   • Drug use: Never   • Sexual  activity: Defer       Medications:     Current Outpatient Medications:   •  Ayr 0.65 % nasal spray, 2 sprays As Needed., Disp: , Rfl:   •  busPIRone (BUSPAR) 7.5 MG tablet, Take 7.5 mg by mouth 2 (Two) Times a Day., Disp: , Rfl:   •  Cholecalciferol 25 MCG (1000 UT) tablet dispersible, Take  by mouth Daily., Disp: , Rfl:   •  EQ Mucus  MG 12 hr tablet, Take 1 tablet by mouth 2 (Two) Times a Day. for 10 days, Disp: , Rfl:   •  hydrocortisone 2.5 % cream, APPLY A THIN LAYER TO THE SORES ON FACE TWICE DAILY, Disp: , Rfl:   •  hydroxychloroquine (PLAQUENIL) 200 MG tablet, Take 200 mg by mouth., Disp: , Rfl:   •  loratadine (CLARITIN) 10 MG tablet, Take 10 mg by mouth As Needed., Disp: , Rfl:   •  mometasone (ELOCON) 0.1 % ointment, Daily., Disp: , Rfl:   •  mupirocin (BACTROBAN) 2 % ointment, APPLY TO LESION ON LIP TWICE DAILY UNTIL RESOLVED, Disp: , Rfl:   •  omeprazole (priLOSEC) 40 MG capsule, Take 40 mg by mouth Daily., Disp: , Rfl:   •  pilocarpine (SALAGEN) 5 MG tablet, Take 5 mg by mouth. DAILY, Disp: , Rfl:   •  sertraline (ZOLOFT) 50 MG tablet, Take 1 tablet by mouth Daily., Disp: 90 tablet, Rfl: 1  •  traZODone (DESYREL) 50 MG tablet, Take 50 mg by mouth Every Night., Disp: , Rfl:   •  Xiidra 5 % ophthalmic solution, , Disp: , Rfl:   •  brompheniramine-pseudoephedrine-DM 30-2-10 MG/5ML syrup, TAKE 10 MLS BY MOUTH ONCE DAILY AT BEDTIME FOR 10 DAYS, Disp: , Rfl:   •  cefdinir (OMNICEF) 300 MG capsule, Take 1 capsule by mouth 2 (Two) Times a Day for 10 days., Disp: 20 capsule, Rfl: 0  •  Nirmatrelvir&Ritonavir 300/100 (PAXLOVID) 20 x 150 MG & 10 x 100MG tablet therapy pack tablet, Take 3 tablets by mouth 2 (Two) Times a Day for 5 days., Disp: 30 tablet, Rfl: 0  •  oseltamivir (Tamiflu) 75 MG capsule, Take 1 capsule by mouth 2 (Two) Times a Day., Disp: 10 capsule, Rfl: 0  •  promethazine-dextromethorphan (PROMETHAZINE-DM) 6.25-15 MG/5ML syrup, Take 5 mL by mouth 4 (Four) Times a Day As Needed for Cough.,  "Disp: 473 mL, Rfl: 0    Allergies:   Allergies   Allergen Reactions   • Erythromycin Nausea And Vomiting and Rash   • Contrast Dye Hives   • Iodinated Diagnostic Agents Hives and Itching   • Iodine Hives         Objective     Physical Exam:  Vital Signs:   Vitals:    11/07/22 1440   BP: 104/66   Pulse: 83   Temp: 97.8 °F (36.6 °C)   SpO2: 99%   Weight: 73 kg (161 lb)   Height: 160 cm (63\")     Body mass index is 28.52 kg/m².     Physical Exam  HENT:      Right Ear: A middle ear effusion is present.      Left Ear: A middle ear effusion is present.      Nose: Congestion and rhinorrhea present.      Right Turbinates: Enlarged and swollen.      Left Turbinates: Enlarged and swollen.      Right Sinus: Maxillary sinus tenderness present.      Left Sinus: Maxillary sinus tenderness present.      Mouth/Throat:      Mouth: Mucous membranes are moist.   Eyes:      Conjunctiva/sclera:      Right eye: Right conjunctiva is injected.      Left eye: Left conjunctiva is injected.   Cardiovascular:      Rate and Rhythm: Normal rate and regular rhythm.      Heart sounds: Normal heart sounds. No murmur heard.  Pulmonary:      Effort: Pulmonary effort is normal.      Breath sounds: Normal breath sounds.   Neurological:      Mental Status: She is alert.             Assessment / Plan      Assessment/Plan:   Diagnoses and all orders for this visit:    1. Cough, unspecified type (Primary)  -     POCT SARS-CoV-2 Antigen TRESA + Flu    2. Nasal congestion  -     POCT SARS-CoV-2 Antigen TRESA + Flu    3. Seasonal allergies    4. Acute non-recurrent maxillary sinusitis    5. COVID    6. Systemic lupus erythematosus, unspecified SLE type, unspecified organ involvement status (HCC)    7. Influenza B    Other orders  -     Nirmatrelvir&Ritonavir 300/100 (PAXLOVID) 20 x 150 MG & 10 x 100MG tablet therapy pack tablet; Take 3 tablets by mouth 2 (Two) Times a Day for 5 days.  Dispense: 30 tablet; Refill: 0  -     oseltamivir (Tamiflu) 75 MG capsule; Take " 1 capsule by mouth 2 (Two) Times a Day.  Dispense: 10 capsule; Refill: 0  -     cefdinir (OMNICEF) 300 MG capsule; Take 1 capsule by mouth 2 (Two) Times a Day for 10 days.  Dispense: 20 capsule; Refill: 0  -     promethazine-dextromethorphan (PROMETHAZINE-DM) 6.25-15 MG/5ML syrup; Take 5 mL by mouth 4 (Four) Times a Day As Needed for Cough.  Dispense: 473 mL; Refill: 0         Cough, nasal congestion flu B and covid positive  Seasonal allergies continue claritin and nasonex  Will start paxlovid patient is high risk due to lupus if becomes in respiratory distress or short of breath call 911 or report to the ER  Acute sinusitis will treat with cefdinir and provide promethatzine DM for cough  Influenza B we will treat with Tamiflu increase fluids Tylenol for pain or temp      Follow Up:   Return if symptoms worsen or fail to improve.    DARSHAN Patterson      Please note that portions of this note were completed with a voice recognition program.

## 2022-11-21 ENCOUNTER — TELEPHONE (OUTPATIENT)
Dept: FAMILY MEDICINE CLINIC | Facility: CLINIC | Age: 57
End: 2022-11-21

## 2022-11-21 ENCOUNTER — HOSPITAL ENCOUNTER (OUTPATIENT)
Dept: GENERAL RADIOLOGY | Facility: HOSPITAL | Age: 57
Discharge: HOME | End: 2022-11-21
Admitting: FAMILY MEDICINE

## 2022-11-21 ENCOUNTER — OFFICE VISIT (OUTPATIENT)
Dept: FAMILY MEDICINE CLINIC | Facility: CLINIC | Age: 57
End: 2022-11-21

## 2022-11-21 VITALS
WEIGHT: 161 LBS | HEIGHT: 63 IN | TEMPERATURE: 98.8 F | DIASTOLIC BLOOD PRESSURE: 71 MMHG | BODY MASS INDEX: 28.53 KG/M2 | OXYGEN SATURATION: 97 % | SYSTOLIC BLOOD PRESSURE: 122 MMHG | RESPIRATION RATE: 19 BRPM | HEART RATE: 97 BPM

## 2022-11-21 DIAGNOSIS — J06.9 VIRAL UPPER RESPIRATORY TRACT INFECTION: Primary | ICD-10-CM

## 2022-11-21 DIAGNOSIS — J06.9 VIRAL UPPER RESPIRATORY TRACT INFECTION: ICD-10-CM

## 2022-11-21 PROCEDURE — 99213 OFFICE O/P EST LOW 20 MIN: CPT | Performed by: FAMILY MEDICINE

## 2022-11-21 PROCEDURE — 71046 X-RAY EXAM CHEST 2 VIEWS: CPT

## 2022-11-21 RX ORDER — ALBUTEROL SULFATE 90 UG/1
2 AEROSOL, METERED RESPIRATORY (INHALATION) EVERY 6 HOURS PRN
Qty: 8 G | Refills: 0 | Status: SHIPPED | OUTPATIENT
Start: 2022-11-21 | End: 2022-12-30

## 2022-11-21 RX ORDER — KETOROLAC TROMETHAMINE 10 MG/1
TABLET, FILM COATED ORAL
COMMUNITY
Start: 2022-11-16 | End: 2022-12-30

## 2022-11-21 RX ORDER — METHYLPREDNISOLONE 4 MG/1
TABLET ORAL
Qty: 21 TABLET | Refills: 0 | Status: SHIPPED | OUTPATIENT
Start: 2022-11-21 | End: 2022-12-30

## 2022-11-21 NOTE — PROGRESS NOTES
Chief Complaint   Patient presents with   • Nasal Congestion     Covid and flu b -11/07/2022        Subjective     Alejandrina Bhardwaj  has a past medical history of Lupus (systemic lupus erythematosus) (HCC), Rheumatoid arthritis (HCC), and Sjogren's disease (HCC).    Nasal congestion- she was diagnosed about 2 weeks ago with COVID and influenza.  She was treated appropriately.  She continues have lots of nasal congestion postnasal drainage and a nonproductive cough.  She does have some shortness of breath and wheezing along with some pleuritic chest pain.      PHQ-2 Depression Screening  Little interest or pleasure in doing things?     Feeling down, depressed, or hopeless?     PHQ-2 Total Score     PHQ-9 Depression Screening  Little interest or pleasure in doing things?     Feeling down, depressed, or hopeless?     Trouble falling or staying asleep, or sleeping too much?     Feeling tired or having little energy?     Poor appetite or overeating?     Feeling bad about yourself - or that you are a failure or have let yourself or your family down?     Trouble concentrating on things, such as reading the newspaper or watching television?     Moving or speaking so slowly that other people could have noticed? Or the opposite - being so fidgety or restless that you have been moving around a lot more than usual?     Thoughts that you would be better off dead, or of hurting yourself in some way?     PHQ-9 Total Score     If you checked off any problems, how difficult have these problems made it for you to do your work, take care of things at home, or get along with other people?       Allergies   Allergen Reactions   • Erythromycin Nausea And Vomiting and Rash   • Contrast Dye Hives   • Iodinated Diagnostic Agents Hives and Itching   • Iodine Hives       Prior to Admission medications    Medication Sig Start Date End Date Taking? Authorizing Provider   Ayr 0.65 % nasal spray 2 sprays As Needed. 5/9/22  Yes Provider, Historical,  MD   brompheniramine-pseudoephedrine-DM 30-2-10 MG/5ML syrup TAKE 10 MLS BY MOUTH ONCE DAILY AT BEDTIME FOR 10 DAYS 10/29/22  Yes Oralia Chris MD   busPIRone (BUSPAR) 7.5 MG tablet Take 7.5 mg by mouth 2 (Two) Times a Day. 8/20/22  Yes Oralia Chris MD   Cholecalciferol 25 MCG (1000 UT) tablet dispersible Take  by mouth Daily.   Yes Oralia Chris MD   EQ Mucus  MG 12 hr tablet Take 1 tablet by mouth 2 (Two) Times a Day. for 10 days 10/30/22  Yes Oralia Chris MD   hydrocortisone 2.5 % cream APPLY A THIN LAYER TO THE SORES ON FACE TWICE DAILY 4/15/22  Yes Oralia Chris MD   hydroxychloroquine (PLAQUENIL) 200 MG tablet Take 200 mg by mouth. 5/16/22  Yes Oralia Chris MD   ketorolac (TORADOL) 10 MG tablet TAKE 1 TABLET BY MOUTH 4 TIMES DAILY AS NEEDED FOR PAIN 11/16/22  Yes Provider, MD Oralia   loratadine (CLARITIN) 10 MG tablet Take 10 mg by mouth As Needed. 5/9/22  Yes ProviderOralia MD   mometasone (ELOCON) 0.1 % ointment Daily. 4/15/22  Yes Oralia Chris MD   mupirocin (BACTROBAN) 2 % ointment APPLY TO LESION ON LIP TWICE DAILY UNTIL RESOLVED 4/15/22  Yes ProviderOralia MD   omeprazole (priLOSEC) 40 MG capsule Take 40 mg by mouth Daily. 5/9/22  Yes ProviderOralia MD   pilocarpine (SALAGEN) 5 MG tablet Take 5 mg by mouth. DAILY 9/22/22  Yes Provider, MD Oralia   sertraline (ZOLOFT) 50 MG tablet Take 1 tablet by mouth Daily. 10/7/22  Yes Rickey Mcginnis,    traZODone (DESYREL) 50 MG tablet Take 50 mg by mouth Every Night. 8/20/22  Yes ProviderOralia MD   Xiidra 5 % ophthalmic solution  9/20/22  Yes Oralia Chris MD   oseltamivir (Tamiflu) 75 MG capsule Take 1 capsule by mouth 2 (Two) Times a Day. 11/7/22 11/21/22  Magy Bauer APRN   promethazine-dextromethorphan (PROMETHAZINE-DM) 6.25-15 MG/5ML syrup Take 5 mL by mouth 4 (Four) Times a Day As Needed for Cough. 11/7/22 11/21/22   Magy Bauer, APRN        Patient Active Problem List   Diagnosis   • Mixed hyperlipidemia   • VICTORIANO (obstructive sleep apnea)   • Palpitations   • Lupus (systemic lupus erythematosus) ([facility])   • Rheumatoid arthritis (HCC)   • Sjogren's disease ([facility])   • PVC's (premature ventricular contractions)   • CREST (calcinosis, Raynaud's phenomenon, esophageal dysfunction, sclerodactyly, telangiectasia) ([facility])   • Chronic fatigue   • Encounter for medical examination to establish care   • Encounter for screening mammogram for malignant neoplasm of breast   • Chronic left shoulder pain   • Acute non-recurrent maxillary sinusitis   • Anxiety   • Chest pain   • Costovertebral angle tenderness   • Difficult or painful urination   • Gastroesophageal reflux disease   • Grade I diastolic dysfunction   • Intertriginous dermatitis associated with moisture   • Left flank pain   • Lymphocytopenia   • Neck pain on right side   • Petechial rash   • Pulmonary nodule less than 1 cm in diameter with low risk for malignant neoplasm   • Right elbow pain   • Scoliosis of thoracic spine   • Strain of left biceps   • Seasonal allergies   • Viral upper respiratory tract infection        No past surgical history on file.    Social History     Socioeconomic History   • Marital status: Single   Tobacco Use   • Smoking status: Never   • Smokeless tobacco: Never   Vaping Use   • Vaping Use: Never used   Substance and Sexual Activity   • Alcohol use: Never   • Drug use: Never   • Sexual activity: Defer       Family History   Problem Relation Age of Onset   • Lupus Mother    • Lupus Sister    • Cancer Paternal Grandmother        Family history, surgical history, past medical history, Allergies and meds reviewed with patient today and updated in [facility] EMR.     ROS:  Review of Systems   Constitutional: Negative for chills and fever.   HENT: Positive for congestion, postnasal drip, rhinorrhea and sinus pressure.    Respiratory: Positive for cough,  "shortness of breath and wheezing.    Cardiovascular: Positive for chest pain.       OBJECTIVE:  Vitals:    11/21/22 1525   BP: 122/71   BP Location: Right arm   Patient Position: Sitting   Cuff Size: Adult   Pulse: 97   Resp: 19   Temp: 98.8 °F (37.1 °C)   TempSrc: Temporal   SpO2: 97%   Weight: 73 kg (161 lb)   Height: 160 cm (63\")     No results found.   Body mass index is 28.52 kg/m².  No LMP recorded.    Physical Exam  Vitals and nursing note reviewed.   Constitutional:       General: She is not in acute distress.     Appearance: Normal appearance. She is normal weight.   HENT:      Head: Normocephalic.      Right Ear: Tympanic membrane, ear canal and external ear normal.      Left Ear: Tympanic membrane, ear canal and external ear normal.      Nose: Nose normal.      Mouth/Throat:      Mouth: Mucous membranes are moist.      Pharynx: Oropharynx is clear.   Eyes:      General: No scleral icterus.     Conjunctiva/sclera: Conjunctivae normal.      Pupils: Pupils are equal, round, and reactive to light.   Cardiovascular:      Rate and Rhythm: Normal rate and regular rhythm.      Pulses: Normal pulses.      Heart sounds: Normal heart sounds. No murmur heard.  Pulmonary:      Effort: Pulmonary effort is normal.      Breath sounds: Normal breath sounds. No wheezing, rhonchi or rales.   Chest:      Chest wall: Tenderness present.          Comments: tenderness  Musculoskeletal:      Cervical back: Neck supple. No rigidity or tenderness.   Lymphadenopathy:      Cervical: No cervical adenopathy.   Skin:     General: Skin is warm and dry.      Coloration: Skin is not jaundiced.      Findings: No rash.   Neurological:      General: No focal deficit present.      Mental Status: She is alert and oriented to person, place, and time.   Psychiatric:         Mood and Affect: Mood normal.         Thought Content: Thought content normal.         Judgment: Judgment normal.         Procedures    Office Visit on 11/07/2022   Component " Date Value Ref Range Status   • SARS Antigen 11/07/2022 Detected (A)  Not Detected, Presumptive Negative Final   • Influenza A Antigen TRESA 11/07/2022 Not Detected  Not Detected Final   • Influenza B Antigen TRESA 11/07/2022 Detected (A)  Not Detected Final   • Internal Control 11/07/2022 Passed  Passed Final   • Lot Number 11/07/2022 [id number]   Final   • Expiration Date 11/07/2022 [id number]   Final       ASSESSMENT/ PLAN:    Diagnoses and all orders for this visit:    1. Viral upper respiratory tract infection (Primary)  Assessment & Plan:  She has persistent URI symptoms since having COVID and influenza.  With her persistent cough wheezing and shortness of breath we will check a chest x-ray.  In the meantime we will add a burst of some steroids and an inhaler.    Orders:  -     XR Chest PA & Lateral; Future    Other orders  -     methylPREDNISolone (MEDROL) 4 MG dose pack; Take as directed on package instructions.  Dispense: 21 tablet; Refill: 0  -     albuterol sulfate  (90 Base) MCG/ACT inhaler; Inhale 2 puffs Every 6 (Six) Hours As Needed for Wheezing.  Dispense: 8 g; Refill: 0      Orders Placed Today:     New Medications Ordered This Visit   Medications   • methylPREDNISolone (MEDROL) 4 MG dose pack     Sig: Take as directed on package instructions.     Dispense:  21 tablet     Refill:  0   • albuterol sulfate  (90 Base) MCG/ACT inhaler     Sig: Inhale 2 puffs Every 6 (Six) Hours As Needed for Wheezing.     Dispense:  8 g     Refill:  0        Management Plan:     An After Visit Summary was printed and given to the patient at discharge.    Follow-up: Return if symptoms worsen or fail to improve.    Rickey Mcginnis, DO 11/21/2022 15:36 EST  This note was electronically signed.

## 2022-11-21 NOTE — ASSESSMENT & PLAN NOTE
She has persistent URI symptoms since having COVID and influenza.  With her persistent cough wheezing and shortness of breath we will check a chest x-ray.  In the meantime we will add a burst of some steroids and an inhaler.

## 2022-12-02 ENCOUNTER — APPOINTMENT (OUTPATIENT)
Dept: CT IMAGING | Facility: HOSPITAL | Age: 57
End: 2022-12-02

## 2022-12-20 ENCOUNTER — TELEPHONE (OUTPATIENT)
Dept: FAMILY MEDICINE CLINIC | Facility: CLINIC | Age: 57
End: 2022-12-20

## 2022-12-20 NOTE — TELEPHONE ENCOUNTER
Caller: Alejandrina Bhardwaj    Relationship: Self    Best call back number: [phone]    What is the best time to reach you: ANY     Who are you requesting to speak with (clinical staff, provider,  specific staff member): CLINICAL       What was the call regarding: PATIENT WENT TO THE EMERGENCY DEPARTMENT ON 12.17.22 AND THEY TOLD HER SHE HAD A ENLARGED SPLEEN/POSSIBLE KIDNEY STONE AND SHE IS WANTING TO SPEAK WITH CLINICAL STAFF TO SEE IF THERE IS ANYTHING SHE CAN DO TO HELP WITH THE PAIN AND ALSO IF SHE SHOULD FOLLOW UP WITH [facility]. PLEASE ADVISE.     Do you require a callback: YES

## 2022-12-29 ENCOUNTER — HOSPITAL ENCOUNTER (OUTPATIENT)
Dept: CT IMAGING | Facility: HOSPITAL | Age: 57
Discharge: HOME | End: 2022-12-29
Admitting: FAMILY MEDICINE

## 2022-12-29 DIAGNOSIS — R16.1 SPLENOMEGALY: ICD-10-CM

## 2022-12-29 PROCEDURE — 74150 CT ABDOMEN W/O CONTRAST: CPT

## 2022-12-30 ENCOUNTER — OFFICE VISIT (OUTPATIENT)
Dept: FAMILY MEDICINE CLINIC | Facility: CLINIC | Age: 57
End: 2022-12-30

## 2022-12-30 VITALS
RESPIRATION RATE: 17 BRPM | HEART RATE: 88 BPM | TEMPERATURE: 97 F | BODY MASS INDEX: 28.53 KG/M2 | HEIGHT: 63 IN | WEIGHT: 161 LBS | DIASTOLIC BLOOD PRESSURE: 70 MMHG | SYSTOLIC BLOOD PRESSURE: 111 MMHG | OXYGEN SATURATION: 97 %

## 2022-12-30 DIAGNOSIS — R16.1 SPLENOMEGALY: ICD-10-CM

## 2022-12-30 DIAGNOSIS — R10.9 LEFT FLANK PAIN: Primary | ICD-10-CM

## 2022-12-30 PROBLEM — R74.8 ELEVATED LIVER ENZYMES: Status: ACTIVE | Noted: 2022-12-30

## 2022-12-30 PROCEDURE — 99214 OFFICE O/P EST MOD 30 MIN: CPT | Performed by: FAMILY MEDICINE

## 2022-12-30 RX ORDER — METHOCARBAMOL 750 MG/1
750 TABLET, FILM COATED ORAL 3 TIMES DAILY PRN
COMMUNITY
Start: 2022-11-29 | End: 2022-12-30

## 2022-12-30 RX ORDER — CYCLOBENZAPRINE HCL 10 MG
TABLET ORAL
COMMUNITY
Start: 2022-12-08 | End: 2022-12-30

## 2022-12-30 RX ORDER — VALACYCLOVIR HYDROCHLORIDE 1 G/1
TABLET, FILM COATED ORAL
COMMUNITY
Start: 2022-11-29 | End: 2022-12-30

## 2022-12-30 NOTE — ASSESSMENT & PLAN NOTE
She is tender over this area in her abdomen.  Her evaluation of the CT scan here as well at [facility] in [place] did show a mildly enlarged spleen.  The ER in [place] they did titers for Fuentes-Barr and CMV.  Her Fuentes-Barr IgM was normal but her IgG was elevated.  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  Instruct her over time that this infection should most likely resolve in her spleen result back to her normal size and her pain resolved.  In the meantime she should attempt to avoid any physical or contact activity as that could cause splenic rupture.

## 2022-12-30 NOTE — ASSESSMENT & PLAN NOTE
Her liver enzymes were mildly elevated.  With the positive Fuentes-Rodas antibodies this could be related to that as well.  This could also be related to her autoimmune disease and causing a autoimmune form of hepatitis.  These that were not significantly elevated though.  There is somewhat of a disparity in the appearance of her liver on her 2 CT scans.  Once in the show that it was normal and the other one suggested may be's some cirrhosis.  The ultrasound of her liver did not suggest this though.  Thus I think it is more likely just some mild inflammation from most likely an Fuentes-Barr infection.  We will hold off and repeat her liver enzymes and blood count in another month.  And contemplate repeating her CT in another 1 to 3 months.

## 2022-12-30 NOTE — PROGRESS NOTES
Chief Complaint   Patient presents with   • Pain     Left side    • Follow-up     ER        Subjective     Alejandrina Bhardwaj  has a past medical history of Lupus (systemic lupus erythematosus) (HCC), Rheumatoid arthritis (HCC), and Sjogren's disease (HCC).    Abdominal Pain  This is a chronic problem. The current episode started more than 1 month ago. The onset quality is gradual. The problem occurs daily. The most recent episode lasted 36 Months. The problem has been waxing and waning. The pain is located in the LLQ, LUQ and left flank. The pain is at a severity of 8/10. The quality of the pain is a sensation of fullness and sharp. The abdominal pain radiates to the LUQ, left shoulder and left flank. Associated symptoms include arthralgias, belching, frequency and myalgias. Pertinent negatives include no anorexia, constipation, diarrhea, dysuria, fever, flatus, headaches, hematochezia, hematuria, melena, nausea, vomiting or weight loss. The pain is aggravated by deep breathing and movement. The pain is relieved by nothing. Prior diagnostic workup includes CT scan.       PHQ-2 Depression Screening  Little interest or pleasure in doing things?     Feeling down, depressed, or hopeless?     PHQ-2 Total Score     PHQ-9 Depression Screening  Little interest or pleasure in doing things?     Feeling down, depressed, or hopeless?     Trouble falling or staying asleep, or sleeping too much?     Feeling tired or having little energy?     Poor appetite or overeating?     Feeling bad about yourself - or that you are a failure or have let yourself or your family down?     Trouble concentrating on things, such as reading the newspaper or watching television?     Moving or speaking so slowly that other people could have noticed? Or the opposite - being so fidgety or restless that you have been moving around a lot more than usual?     Thoughts that you would be better off dead, or of hurting yourself in some way?     PHQ-9 Total Score      If you checked off any problems, how difficult have these problems made it for you to do your work, take care of things at home, or get along with other people?       Allergies   Allergen Reactions   • Erythromycin Nausea And Vomiting and Rash   • Contrast Dye Hives   • Iodinated Contrast Media Hives and Itching   • Iodine Hives       Prior to Admission medications    Medication Sig Start Date End Date Taking? Authorizing Provider   Ayr 0.65 % nasal spray 2 sprays As Needed. 5/9/22  Yes Oralia Chris MD   Cholecalciferol 25 MCG (1000 UT) tablet dispersible Take  by mouth Daily.   Yes Oralia Chris MD   hydrocortisone 2.5 % cream APPLY A THIN LAYER TO THE SORES ON FACE TWICE DAILY 4/15/22  Yes Oralia Chris MD   hydroxychloroquine (PLAQUENIL) 200 MG tablet Take 200 mg by mouth. 5/16/22  Yes Oralia Chris MD   loratadine (CLARITIN) 10 MG tablet Take 10 mg by mouth As Needed. 5/9/22  Yes Oralia Chris MD   mometasone (ELOCON) 0.1 % ointment Daily. 4/15/22  Yes Oralia Chris MD   mupirocin (BACTROBAN) 2 % ointment APPLY TO LESION ON LIP TWICE DAILY UNTIL RESOLVED 4/15/22  Yes Oralia Chris MD   nystatin (MYCOSTATIN) 100,000 unit/mL suspension TAKE 5 ML BY MOUTH FOUR TIMES DAILY FOR 7-10 DAYS (RETAIN IN MOUTH AS LONG AS YOU CAN) 12/1/22  Yes Oralia Chris MD   omeprazole (priLOSEC) 40 MG capsule Take 40 mg by mouth Daily. 5/9/22  Yes Oralia Chris MD   pilocarpine (SALAGEN) 5 MG tablet Take 5 mg by mouth. DAILY 9/22/22  Yes Oralia Chris MD   sertraline (ZOLOFT) 50 MG tablet Take 1 tablet by mouth Daily. 10/7/22  Yes Rickey Mcginnis, DO   traZODone (DESYREL) 50 MG tablet Take 50 mg by mouth Every Night. 8/20/22   Oralia Chris MD   albuterol sulfate  (90 Base) MCG/ACT inhaler Inhale 2 puffs Every 6 (Six) Hours As Needed for Wheezing. 11/21/22 12/30/22  Rickey Mcginnis, DO   brompheniramine-pseudoephedrine-DM  30-2-10 MG/5ML syrup TAKE 10 MLS BY MOUTH ONCE DAILY AT BEDTIME FOR 10 DAYS 10/29/22 12/30/22  Oralia Chris MD   busPIRone (BUSPAR) 7.5 MG tablet Take 7.5 mg by mouth 2 (Two) Times a Day. 8/20/22 12/30/22  Oralia Chris MD   cyclobenzaprine (FLEXERIL) 10 MG tablet  12/8/22 12/30/22  Oralia Chris MD   EQ Mucus  MG 12 hr tablet Take 1 tablet by mouth 2 (Two) Times a Day. for 10 days 10/30/22 12/30/22  Oralia Chris MD   ketorolac (TORADOL) 10 MG tablet TAKE 1 TABLET BY MOUTH 4 TIMES DAILY AS NEEDED FOR PAIN 11/16/22 12/30/22  Oralia Chris MD   methocarbamol (ROBAXIN) 750 MG tablet Take 750 mg by mouth 3 (Three) Times a Day As Needed. for muscle spams 11/29/22 12/30/22  Oralia Chris MD   methylPREDNISolone (MEDROL) 4 MG dose pack Take as directed on package instructions. 11/21/22 12/30/22  Rickey Mcginnis,    valACYclovir (VALTREX) 1000 MG tablet TAKE 1 TABLET BY MOUTH EVERY 12 HOURS FOR 3 DAYS 11/29/22 12/30/22  Oralia Chris MD   Xiidra 5 % ophthalmic solution  9/20/22 12/30/22  Oralia Chris MD        Patient Active Problem List   Diagnosis   • Mixed hyperlipidemia   • VICTORIANO (obstructive sleep apnea)   • Palpitations   • Lupus (systemic lupus erythematosus) ([facility])   • Rheumatoid arthritis ([facility])   • Sjogren's disease ([facility])   • PVC's (premature ventricular contractions)   • CREST (calcinosis, Raynaud's phenomenon, esophageal dysfunction, sclerodactyly, telangiectasia) ([facility])   • Chronic fatigue   • Encounter for medical examination to establish care   • Encounter for screening mammogram for malignant neoplasm of breast   • Chronic left shoulder pain   • Acute non-recurrent maxillary sinusitis   • Anxiety   • Chest pain   • Costovertebral angle tenderness   • Difficult or painful urination   • Gastroesophageal reflux disease   • Grade I diastolic dysfunction   • Intertriginous dermatitis associated with moisture   • Left flank pain   •  Lymphocytopenia   • Neck pain on right side   • Petechial rash   • Pulmonary nodule less than 1 cm in diameter with low risk for malignant neoplasm   • Right elbow pain   • Scoliosis of thoracic spine   • Strain of left biceps   • Seasonal allergies   • Viral upper respiratory tract infection   • Splenomegaly   • Elevated liver enzymes        No past surgical history on file.    Social History     Socioeconomic History   • Marital status: Single   Tobacco Use   • Smoking status: Never   • Smokeless tobacco: Never   Vaping Use   • Vaping Use: Never used   Substance and Sexual Activity   • Alcohol use: Never   • Drug use: Never   • Sexual activity: Defer       Family History   Problem Relation Age of Onset   • Lupus Mother    • Lupus Sister    • Cancer Paternal Grandmother        Family history, surgical history, past medical history, Allergies and meds reviewed with patient today and updated in [facility] EMR.     ROS:  Review of Systems   Constitutional: Negative for fever and unexpected weight loss.   Gastrointestinal: Positive for abdominal pain. Negative for anorexia, constipation, diarrhea, flatus, hematochezia, melena, nausea and vomiting.   Genitourinary: Positive for frequency. Negative for dysuria and hematuria.   Musculoskeletal: Positive for arthralgias and myalgias.   CT of her abdomen showed a mildly enlarged spleen and abnormal contour of her liver suggestive of cirrhosis.  She had a CT of her abdomen about 10 days prior down at [facility] in [place] which said that the liver was homogeneous but did show a mildly enlarged spleen.  Her recent labs do show a mildly elevated liver enzymes and a decreased platelet count.  Her platelet count has been decreased for well over a year.    OBJECTIVE:  Vitals:    12/30/22 0951   BP: 111/70   BP Location: Left arm   Patient Position: Sitting   Cuff Size: Adult   Pulse: 88   Resp: 17   Temp: 97 °F (36.1 °C)   TempSrc: Temporal   SpO2: 97%   Weight: 73 kg (161 lb)  "  Height: 160 cm (63\")     No results found.   Body mass index is 28.52 kg/m².  No LMP recorded.    Physical Exam  Vitals and nursing note reviewed.   Constitutional:       General: She is not in acute distress.     Appearance: Normal appearance. She is normal weight.   HENT:      Head: Normocephalic.   Cardiovascular:      Rate and Rhythm: Normal rate and regular rhythm.      Heart sounds: Normal heart sounds. No murmur heard.  Pulmonary:      Breath sounds: Normal breath sounds. No wheezing.   Abdominal:      General: Abdomen is flat. Bowel sounds are normal.      Palpations: Abdomen is soft. There is splenomegaly. There is no hepatomegaly.      Tenderness: There is abdominal tenderness in the left upper quadrant.   Neurological:      Mental Status: She is alert.         Procedures    No visits with results within 30 Day(s) from this visit.   Latest known visit with results is:   Office Visit on 11/07/2022   Component Date Value Ref Range Status   • SARS Antigen 11/07/2022 Detected (A)  Not Detected, Presumptive Negative Final   • Influenza A Antigen TRESA 11/07/2022 Not Detected  Not Detected Final   • Influenza B Antigen TRESA 11/07/2022 Detected (A)  Not Detected Final   • Internal Control 11/07/2022 Passed  Passed Final   • Lot Number 11/07/2022 [id number]   Final   • Expiration Date 11/07/2022 [id number]   Final       ASSESSMENT/ PLAN:    Diagnoses and all orders for this visit:    1. Left flank pain (Primary)    2. Splenomegaly  Assessment & Plan:  She is tender over this area in her abdomen.  Her evaluation of the CT scan here as well at [facility] in [place] did show a mildly enlarged spleen.  The ER in [place] they did titers for Fuentes-Barr and CMV.  Her Fuentes-Barr IgM was normal but her IgG was elevated.  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  Instruct her over time that this infection should most likely resolve in her spleen result back to her " normal size and her pain resolved.  In the meantime she should attempt to avoid any physical or contact activity as that could cause splenic rupture.        Orders Placed Today:     No orders of the defined types were placed in this encounter.       Management Plan:     An After Visit Summary was printed and given to the patient at discharge.    Follow-up: Return in about 4 weeks (around 1/27/2023).    Rickey Mcginnis,  12/30/2022 10:42 EST  This note was electronically signed.  Answers for HPI/ROS submitted by the patient on 12/27/2022  What is the primary reason for your visit?: Abdominal Pain

## 2023-01-10 ENCOUNTER — APPOINTMENT (OUTPATIENT)
Dept: MAMMOGRAPHY | Facility: HOSPITAL | Age: 58
End: 2023-01-10
Payer: MEDICAID